# Patient Record
Sex: MALE | Race: WHITE | URBAN - METROPOLITAN AREA
[De-identification: names, ages, dates, MRNs, and addresses within clinical notes are randomized per-mention and may not be internally consistent; named-entity substitution may affect disease eponyms.]

---

## 2018-08-03 ENCOUNTER — OFFICE VISIT (OUTPATIENT)
Dept: INTERNAL MEDICINE | Facility: CLINIC | Age: 53
End: 2018-08-03
Payer: COMMERCIAL

## 2018-08-03 VITALS
TEMPERATURE: 98.1 F | OXYGEN SATURATION: 93 % | SYSTOLIC BLOOD PRESSURE: 128 MMHG | DIASTOLIC BLOOD PRESSURE: 90 MMHG | BODY MASS INDEX: 32.64 KG/M2 | HEART RATE: 76 BPM | HEIGHT: 69 IN | WEIGHT: 220.4 LBS | RESPIRATION RATE: 14 BRPM

## 2018-08-03 DIAGNOSIS — Z11.4 SCREENING FOR HIV (HUMAN IMMUNODEFICIENCY VIRUS): ICD-10-CM

## 2018-08-03 DIAGNOSIS — R03.0 ELEVATED BLOOD PRESSURE READING WITHOUT DIAGNOSIS OF HYPERTENSION: ICD-10-CM

## 2018-08-03 DIAGNOSIS — Z13.1 SCREENING FOR DIABETES MELLITUS: ICD-10-CM

## 2018-08-03 DIAGNOSIS — Z13.220 SCREENING CHOLESTEROL LEVEL: ICD-10-CM

## 2018-08-03 DIAGNOSIS — Z00.00 ENCOUNTER FOR ROUTINE ADULT HEALTH EXAMINATION WITHOUT ABNORMAL FINDINGS: Primary | ICD-10-CM

## 2018-08-03 LAB
CHOLEST SERPL-MCNC: 181 MG/DL
GLUCOSE SERPL-MCNC: 103 MG/DL (ref 70–99)
HDLC SERPL-MCNC: 41 MG/DL
LDLC SERPL CALC-MCNC: 100 MG/DL
NONHDLC SERPL-MCNC: 140 MG/DL
TRIGL SERPL-MCNC: 199 MG/DL

## 2018-08-03 PROCEDURE — 36415 COLL VENOUS BLD VENIPUNCTURE: CPT | Performed by: PHYSICIAN ASSISTANT

## 2018-08-03 PROCEDURE — 87389 HIV-1 AG W/HIV-1&-2 AB AG IA: CPT | Performed by: PHYSICIAN ASSISTANT

## 2018-08-03 PROCEDURE — 99386 PREV VISIT NEW AGE 40-64: CPT | Performed by: PHYSICIAN ASSISTANT

## 2018-08-03 PROCEDURE — 82947 ASSAY GLUCOSE BLOOD QUANT: CPT | Performed by: PHYSICIAN ASSISTANT

## 2018-08-03 PROCEDURE — 80061 LIPID PANEL: CPT | Performed by: PHYSICIAN ASSISTANT

## 2018-08-03 NOTE — PATIENT INSTRUCTIONS
Preventive Health Recommendations  Male Ages 50 - 64    Yearly exam:             See your health care provider every year in order to  o   Review health changes.   o   Discuss preventive care.    o   Review your medicines if your doctor has prescribed any.     Have a cholesterol test every 5 years, or more frequently if you are at risk for high cholesterol/heart disease.     Have a diabetes test (fasting glucose) every three years. If you are at risk for diabetes, you should have this test more often.     Have a colonoscopy at age 50, or have a yearly FIT test (stool test). These exams will check for colon cancer.      Talk with your health care provider about whether or not a prostate cancer screening test (PSA) is right for you.    You should be tested each year for STDs (sexually transmitted diseases), if you re at risk.     Shots: Get a flu shot each year. Get a tetanus shot every 10 years.     Nutrition:    Eat at least 5 servings of fruits and vegetables daily.     Eat whole-grain bread, whole-wheat pasta and brown rice instead of white grains and rice.     Get adequate Calcium and Vitamin D.     Lifestyle    Exercise for at least 150 minutes a week (30 minutes a day, 5 days a week). This will help you control your weight and prevent disease.     Limit alcohol to one drink per day.     No smoking.     Wear sunscreen to prevent skin cancer.     See your dentist every six months for an exam and cleaning.     See your eye doctor every 1 to 2 years.  Blood pressure lifestyle modifications:   1. Weight loss:    -calorie tracker- try an alton or web site calorie tracker for a few days to track your diet and make note of improvements that will aid in weight loss    2. Heart-Healthy diet   There is no single diet that is right for everyone. However, a healthy diet can include:  ?Lots of fruits, vegetables, and whole grains  - at least 5 servings of fruits and vegetables daily   - whole grains: try to make at least  "half of the grains in your diet whole grains       *examples: oatmeal, barley, brown rice, Millet, popcorn and whole wheat bread,        pasta and crackers   ?Some beans, peas, lentils, chickpeas, and similar foods  ?Some nuts, such as walnuts, almonds, and peanuts  ?Fat-free or low-fat milk and milk products  ?Some fish  To have a healthy diet, it's also important to limit or avoid sugar, sweets, meats, and refined grains. (Refined grains are found in white bread, white rice, most forms of pasta, and most packaged \"snack\" foods.)  3. Sodium reduction   Reduce salt -- Many people think that eating a low-sodium diet means avoiding the salt shaker and not adding salt when cooking. The truth is, not adding salt at the table or when you cook will only help a little. Almost all of the sodium you eat is already in the food you buy at the grocery store or at restaurants.  The most important thing you can do to cut down on sodium is to eat less processed food. That means that you should avoid most foods that are sold in cans, boxes, jars, and bags. You should also eat in restaurants less often.  To reduce the amount of sodium you get, buy fresh or fresh-frozen fruits, vegetables, and meats. (Fresh-frozen foods have had nothing added to them before freezing.) Then you can make meals at home, from scratch, with these ingredients.  As with the other changes, don't try to cut out salt all at once. Instead, choose 1 or 2 foods that have a lot of sodium and try to replace them with low-sodium choices. When you get used to those low-sodium options, find another food or 2 to change. Then keep going, until all the foods you eat are sodium-free or low in sodium.    4. Physical activity- 40 minutes most days of the week   Become more active -- If you want to be more active, you don't have to go to the gym or get all sweaty. It is possible to increase your activity level while doing everyday things you enjoy. Walking, gardening, and " "dancing are just a few of the things that you might try. As with all the other changes, the key is not to do too much too fast. If you don't do any activity now, start by walking for just a few minutes every other day. Do that for a few weeks. If you stick with it, try doing it for longer. But if you find that you don't like walking, try a different activity. Try to find a form of physical activity that you enjoy to do!    5. Limit alcohol intake to two drinks per day    Woman, do not have more than 1 \"standard drink\" of alcohol a day. If you are a man, do not have more than 2. A \"standard drink\" is:  ?A can or bottle that has 12 ounces of beer  ?A glass that has 5 ounces of wine  ?A shot that has 1.5 ounces of whiskey  "

## 2018-08-03 NOTE — PROGRESS NOTES
SUBJECTIVE:   CC: Baldo Treviño is an 52 year old male who presents for preventative health visit.     Physical   Annual:     Getting at least 3 servings of Calcium per day:  NO    Bi-annual eye exam:  Yes    Dental care twice a year:  Yes    Sleep apnea or symptoms of sleep apnea:  Excessive snoring    Diet:  Regular (no restrictions)    Frequency of exercise:  2-3 days/week    Duration of exercise:  15-30 minutes    Taking medications regularly:  Yes    Medication side effects:  None    Additional concerns today:  No    Patient is fasting. He has an insurance form through his wife's work that he needs completed with lab measurements.     Pt notes his mother  from colon cancer. Pt has been getting a colonoscopy every 5 years since age 36. He just had his last at age 51.     Today's PHQ-2 Score:   PHQ-2 (  [employer]) 8/3/2018   Q1: Little interest or pleasure in doing things 0   Q2: Feeling down, depressed or hopeless 0   PHQ-2 Score 0   Q1: Little interest or pleasure in doing things Not at all   Q2: Feeling down, depressed or hopeless Not at all   PHQ-2 Score 0       Abuse: Current or Past(Physical, Sexual or Emotional)- No  Do you feel safe in your environment - Yes    Social History   Substance Use Topics     Smoking status: Never Smoker     Smokeless tobacco: Never Used     Alcohol use No     Alcohol Use 8/3/2018   If you drink alcohol do you typically have greater than 3 drinks per day OR greater than 7 drinks per week? No       Last PSA: No results found for: PSA    Reviewed orders with patient. Reviewed health maintenance and updated orders accordingly - Yes    Reviewed and updated as needed this visit by clinical staff  Tobacco  Allergies  Meds  Problems  Med Hx  Surg Hx  Fam Hx  Soc Hx          Reviewed and updated as needed this visit by Provider  Tobacco  Meds  Problems  Fam Hx  Soc Hx         Review of Systems  CONSTITUTIONAL: NEGATIVE for fever, chills, change in  "weight  INTEGUMENTARY/SKIN: NEGATIVE for worrisome rashes, moles or lesions  EYES: NEGATIVE for vision changes or irritation  ENT: NEGATIVE for ear, mouth and throat problems  RESP: NEGATIVE for significant cough or SOB  CV: NEGATIVE for chest pain, palpitations or peripheral edema  GI: NEGATIVE for nausea, abdominal pain, heartburn, or change in bowel habits   male: negative for dysuria, hematuria, decreased urinary stream, erectile dysfunction, urethral discharge  MUSCULOSKELETAL: NEGATIVE for significant arthralgias or myalgia  NEURO: NEGATIVE for weakness, dizziness or paresthesias  PSYCHIATRIC: NEGATIVE for changes in mood or affect    OBJECTIVE:   BP (!) 128/94 (BP Location: Right arm, Patient Position: Chair, Cuff Size: Adult Large)  Pulse 76  Temp 98.1  F (36.7  C) (Oral)  Resp 14  Ht 5' 8.5\" (1.74 m)  Wt 220 lb 6.4 oz (100 kg)  SpO2 93%  BMI 33.02 kg/m2    Physical Exam  GENERAL: healthy, alert and no distress  EYES: Eyes grossly normal to inspection, PERRL and conjunctivae and sclerae normal  HENT: ear canals and TM's normal, nose and mouth without ulcers or lesions  NECK: no adenopathy, no asymmetry, masses, or scars and thyroid normal to palpation  RESP: lungs clear to auscultation - no rales, rhonchi or wheezes  CV: regular rate and rhythm, normal S1 S2, no S3 or S4, no murmur, click or rub, no peripheral edema and peripheral pulses strong  ABDOMEN: soft, nontender, no hepatosplenomegaly, no masses and bowel sounds normal  MS: no gross musculoskeletal defects noted, no edema  SKIN: no suspicious lesions or rashes  NEURO: Normal strength and tone, mentation intact and speech normal  PSYCH: mentation appears normal, affect normal/bright    ASSESSMENT/PLAN:     1. Encounter for routine adult health examination without abnormal findings  - normal exam without concerns   - work form partially completed, will finish and fax once labs are completed     2. Screening for HIV (human immunodeficiency " virus)  - HIV Screening    3. Screening cholesterol level  - Lipid panel reflex to direct LDL Fasting    4. Screening for diabetes mellitus  - GLUCOSE    5. Elevated blood pressure reading without diagnosis of hypertension  - reviewed lifestyle modifications   - recheck in 3-6 months     Pt agrees to above plan and all questions are answered.     India Arceo PA-C  [facility]

## 2018-08-03 NOTE — MR AVS SNAPSHOT
After Visit Summary   8/3/2018    Baldo Treviño    MRN: [id number]           Patient Information     Date Of Birth          1965        Visit Information        Provider Department      8/3/2018 8:00 AM India Arceo PA-C [facility]        Today's Diagnoses     Screening cholesterol level    -  1    Screening for HIV (human immunodeficiency virus)        Need for prophylactic vaccination with tetanus-diphtheria (TD)        Screening for diabetes mellitus          Care Instructions      Preventive Health Recommendations  Male Ages 50 - 64    Yearly exam:             See your health care provider every year in order to  o   Review health changes.   o   Discuss preventive care.    o   Review your medicines if your doctor has prescribed any.     Have a cholesterol test every 5 years, or more frequently if you are at risk for high cholesterol/heart disease.     Have a diabetes test (fasting glucose) every three years. If you are at risk for diabetes, you should have this test more often.     Have a colonoscopy at age 50, or have a yearly FIT test (stool test). These exams will check for colon cancer.      Talk with your health care provider about whether or not a prostate cancer screening test (PSA) is right for you.    You should be tested each year for STDs (sexually transmitted diseases), if you re at risk.     Shots: Get a flu shot each year. Get a tetanus shot every 10 years.     Nutrition:    Eat at least 5 servings of fruits and vegetables daily.     Eat whole-grain bread, whole-wheat pasta and brown rice instead of white grains and rice.     Get adequate Calcium and Vitamin D.     Lifestyle    Exercise for at least 150 minutes a week (30 minutes a day, 5 days a week). This will help you control your weight and prevent disease.     Limit alcohol to one drink per day.     No smoking.     Wear sunscreen to prevent skin cancer.     See your dentist every six months  "for an exam and cleaning.     See your eye doctor every 1 to 2 years.  Blood pressure lifestyle modifications:   1. Weight loss:    -calorie tracker- try an alton or web site calorie tracker for a few days to track your diet and make note of improvements that will aid in weight loss    2. Heart-Healthy diet   There is no single diet that is right for everyone. However, a healthy diet can include:  ?Lots of fruits, vegetables, and whole grains  - at least 5 servings of fruits and vegetables daily   - whole grains: try to make at least half of the grains in your diet whole grains       *examples: oatmeal, barley, brown rice, Millet, popcorn and whole wheat bread,        pasta and crackers   ?Some beans, peas, lentils, chickpeas, and similar foods  ?Some nuts, such as walnuts, almonds, and peanuts  ?Fat-free or low-fat milk and milk products  ?Some fish  To have a healthy diet, it's also important to limit or avoid sugar, sweets, meats, and refined grains. (Refined grains are found in white bread, white rice, most forms of pasta, and most packaged \"snack\" foods.)  3. Sodium reduction   Reduce salt -- Many people think that eating a low-sodium diet means avoiding the salt shaker and not adding salt when cooking. The truth is, not adding salt at the table or when you cook will only help a little. Almost all of the sodium you eat is already in the food you buy at the grocery store or at restaurants.  The most important thing you can do to cut down on sodium is to eat less processed food. That means that you should avoid most foods that are sold in cans, boxes, jars, and bags. You should also eat in restaurants less often.  To reduce the amount of sodium you get, buy fresh or fresh-frozen fruits, vegetables, and meats. (Fresh-frozen foods have had nothing added to them before freezing.) Then you can make meals at home, from scratch, with these ingredients.  As with the other changes, don't try to cut out salt all at once. " "Instead, choose 1 or 2 foods that have a lot of sodium and try to replace them with low-sodium choices. When you get used to those low-sodium options, find another food or 2 to change. Then keep going, until all the foods you eat are sodium-free or low in sodium.    4. Physical activity- 40 minutes most days of the week   Become more active -- If you want to be more active, you don't have to go to the gym or get all sweaty. It is possible to increase your activity level while doing everyday things you enjoy. Walking, gardening, and dancing are just a few of the things that you might try. As with all the other changes, the key is not to do too much too fast. If you don't do any activity now, start by walking for just a few minutes every other day. Do that for a few weeks. If you stick with it, try doing it for longer. But if you find that you don't like walking, try a different activity. Try to find a form of physical activity that you enjoy to do!    5. Limit alcohol intake to two drinks per day    Woman, do not have more than 1 \"standard drink\" of alcohol a day. If you are a man, do not have more than 2. A \"standard drink\" is:  ?A can or bottle that has 12 ounces of beer  ?A glass that has 5 ounces of wine  ?A shot that has 1.5 ounces of whiskey          Follow-ups after your visit        Who to contact     If you have questions or need follow up information about today's clinic visit or your schedule please contact [facility] directly at [phone].  Normal or non-critical lab and imaging results will be communicated to you by MyChart, letter or phone within 4 business days after the clinic has received the results. If you do not hear from us within 7 days, please contact the clinic through [employer] or phone. If you have a critical or abnormal lab result, we will notify you by phone as soon as possible.  Submit refill requests through [employer] or call your pharmacy and they will forward " "the refill request to us. Please allow 3 business days for your refill to be completed.          Additional Information About Your Visit        Team Aparthart Information     [employer] gives you secure access to your electronic health record. If you see a primary care provider, you can also send messages to your care team and make appointments. If you have questions, please call your primary care clinic.  If you do not have a primary care provider, please call [phone] and they will assist you.        Care EveryWhere ID     This is your Care EveryWhere ID. This could be used by other organizations to access your [facility] medical records  [id number]        Your Vitals Were     Pulse Temperature Respirations Height Pulse Oximetry BMI (Body Mass Index)    76 98.1  F (36.7  C) (Oral) 14 5' 8.5\" (1.74 m) 93% 33.02 kg/m2       Blood Pressure from Last 3 Encounters:   08/03/18 128/90    Weight from Last 3 Encounters:   08/03/18 220 lb 6.4 oz (100 kg)              We Performed the Following     GLUCOSE     HIV Screening     Lipid panel reflex to direct LDL Fasting        Primary Care Provider Fax #    Physician No Ref-Primary [phone]       No address on file        Equal Access to Services     [facility] : Hadii chandler renteria hadasho Soloriali, waaxda luqadaha, qaybta kaalmada adekrystenyada, ghislaine galo . So [facility] [phone].    ATENCIÓN: Si habla español, tiene a kemp disposición servicios gratuitos de asistencia lingüística. Wilfridoame al [phone].    We comply with applicable federal civil rights laws and [place] laws. We do not discriminate on the basis of race, color, national origin, age, disability, sex, sexual orientation, or gender identity.            Thank you!     Thank you for choosing [facility]  for your care. Our goal is always to provide you with excellent care. Hearing back from our patients is one way we can continue to improve our services. Please take a few " minutes to complete the written survey that you may receive in the mail after your visit with us. Thank you!             Your Updated Medication List - Protect others around you: Learn how to safely use, store and throw away your medicines at www.disposemymeds.org.          This list is accurate as of 8/3/18  8:42 AM.  Always use your most recent med list.                   Brand Name Dispense Instructions for use Diagnosis    MULTI VITAMIN DAILY PO

## 2018-08-06 LAB — HIV 1+2 AB+HIV1 P24 AG SERPL QL IA: NONREACTIVE

## 2018-12-20 ENCOUNTER — OFFICE VISIT (OUTPATIENT)
Dept: INTERNAL MEDICINE | Facility: CLINIC | Age: 53
End: 2018-12-20
Payer: COMMERCIAL

## 2018-12-20 VITALS
BODY MASS INDEX: 34.13 KG/M2 | OXYGEN SATURATION: 96 % | HEART RATE: 85 BPM | WEIGHT: 227.8 LBS | SYSTOLIC BLOOD PRESSURE: 120 MMHG | TEMPERATURE: 98.9 F | DIASTOLIC BLOOD PRESSURE: 86 MMHG

## 2018-12-20 DIAGNOSIS — Z23 NEED FOR VACCINATION: Primary | ICD-10-CM

## 2018-12-20 DIAGNOSIS — Z23 NEED FOR PROPHYLACTIC VACCINATION AND INOCULATION AGAINST INFLUENZA: ICD-10-CM

## 2018-12-20 DIAGNOSIS — R06.83 SNORING: ICD-10-CM

## 2018-12-20 PROCEDURE — 90682 RIV4 VACC RECOMBINANT DNA IM: CPT | Performed by: PHYSICIAN ASSISTANT

## 2018-12-20 PROCEDURE — 99212 OFFICE O/P EST SF 10 MIN: CPT | Mod: 25 | Performed by: PHYSICIAN ASSISTANT

## 2018-12-20 PROCEDURE — 90471 IMMUNIZATION ADMIN: CPT | Performed by: PHYSICIAN ASSISTANT

## 2018-12-20 PROCEDURE — 90472 IMMUNIZATION ADMIN EACH ADD: CPT | Performed by: PHYSICIAN ASSISTANT

## 2018-12-20 PROCEDURE — 90715 TDAP VACCINE 7 YRS/> IM: CPT | Performed by: PHYSICIAN ASSISTANT

## 2018-12-20 NOTE — PROGRESS NOTES
SUBJECTIVE:   Baldo Treviño is a 53 year old male who presents to clinic today for the following health issues:      Audie has concerns with sleep apnea and wants flu shot. Pt notes a couple years ago he had a colonoscopy and the nurses had concerns that patient had sleep apnea. Pt notes his wife notes that he has loud snoring and stops breathing. Pt notes feeling fatigued and sleepiness. Pt's brother has ZAHRA.     Problem list and histories reviewed & adjusted, as indicated.  Additional history: as documented    Reviewed and updated as needed this visit by clinical staff  Tobacco  Allergies  Meds  Problems       Reviewed and updated as needed this visit by Provider  Meds  Problems         OBJECTIVE:     /86   Pulse 85   Temp 98.9  F (37.2  C) (Oral)   Wt 103.3 kg (227 lb 12.8 oz)   SpO2 96%   BMI 34.13 kg/m    Body mass index is 34.13 kg/m .  GENERAL: healthy, alert and no distress    Diagnostic Test Results:  none     ASSESSMENT/PLAN:       1. Need for vaccination  - TDAP VACCINE (ADACEL) [21801.002]  - 1st  Administration  [82413]    2. Need for prophylactic vaccination and inoculation against influenza  - Vaccine Administration, Each Additional [21603]  - FLU VACCINE, (RIV4) RECOMBINANT HA  , IM (FluBlok, egg free) [93069]- >18 YRS ([facility] recommended  50-64 YRS)    3. Snoring  - SLEEP EVALUATION & MANAGEMENT REFERRAL - ADULT -[facility] [phone]  (Age 18 and up); Future      India Rosa PA-C  [facility]      Injectable Influenza Immunization Documentation    1.  Is the person to be vaccinated sick today?   No    2. Does the person to be vaccinated have an allergy to a component   of the vaccine?   No  Egg Allergy Algorithm Link    3. Has the person to be vaccinated ever had a serious reaction   to influenza vaccine in the past?   No    4. Has the person to be vaccinated ever had Guillain-Barré syndrome?   No    Form completed by  Eliz Mistry

## 2018-12-28 ENCOUNTER — OFFICE VISIT (OUTPATIENT)
Dept: SLEEP MEDICINE | Facility: CLINIC | Age: 53
End: 2018-12-28
Attending: PHYSICIAN ASSISTANT
Payer: COMMERCIAL

## 2018-12-28 VITALS
BODY MASS INDEX: 34.71 KG/M2 | SYSTOLIC BLOOD PRESSURE: 119 MMHG | RESPIRATION RATE: 16 BRPM | TEMPERATURE: 98.1 F | DIASTOLIC BLOOD PRESSURE: 85 MMHG | WEIGHT: 229 LBS | HEART RATE: 92 BPM | HEIGHT: 68 IN

## 2018-12-28 DIAGNOSIS — G47.33 OSA (OBSTRUCTIVE SLEEP APNEA): ICD-10-CM

## 2018-12-28 DIAGNOSIS — E66.811 OBESITY (BMI 30.0-34.9): ICD-10-CM

## 2018-12-28 PROCEDURE — 99243 OFF/OP CNSLTJ NEW/EST LOW 30: CPT | Performed by: INTERNAL MEDICINE

## 2018-12-28 ASSESSMENT — MIFFLIN-ST. JEOR: SCORE: 1863.24

## 2018-12-28 NOTE — PATIENT INSTRUCTIONS
Your BMI is Body mass index is 34.82 kg/m .  Weight management is a personal decision.  If you are interested in exploring weight loss strategies, the following discussion covers the approaches that may be successful. Body mass index (BMI) is one way to tell whether you are at a healthy weight, overweight, or obese. It measures your weight in relation to your height.  A BMI of 18.5 to 24.9 is in the healthy range. A person with a BMI of 25 to 29.9 is considered overweight, and someone with a BMI of 30 or greater is considered obese. More than two-thirds of American adults are considered overweight or obese.  Being overweight or obese increases the risk for further weight gain. Excess weight may lead to heart disease and diabetes.  Creating and following plans for healthy eating and physical activity may help you improve your health.  Weight control is part of healthy lifestyle and includes exercise, emotional health, and healthy eating habits. Careful eating habits lifelong are the mainstay of weight control. Though there are significant health benefits from weight loss, long-term weight loss with diet alone may be very difficult to achieve- studies show long-term success with dietary management in less than 10% of people. Attaining a healthy weight may be especially difficult to achieve in those with severe obesity. In some cases, medications, devices and surgical management might be considered.  What can you do?  If you are overweight or obese and are interested in methods for weight loss, you should discuss this with your provider.     Consider reducing daily calorie intake by 500 calories.     Keep a food journal.     Avoiding skipping meals, consider cutting portions instead.    Diet combined with exercise helps maintain muscle while optimizing fat loss. Strength training is particularly important for building and maintaining muscle mass. Exercise helps reduce stress, increase energy, and improves fitness.  Increasing exercise without diet control, however, may not burn enough calories to loose weight.       Start walking three days a week 10-20 minutes at a time    Work towards walking thirty minutes five days a week     Eventually, increase the speed of your walking for 1-2 minutes at time    In addition, we recommend that you review healthy lifestyles and methods for weight loss available through the National Institutes of Health patient information sites:  http://win.niddk.nih.gov/publications/index.htm    And look into health and wellness programs that may be available through your health insurance provider, employer, local community center, or [employer]oretha club.    Weight management plan: Patient was referred to their PCP to discuss a diet and exercise plan.

## 2018-12-28 NOTE — PROGRESS NOTES
Sleep Consultation:    Date on this visit: 12/28/2018    Baldo Treviño is sent by India Rosa for a sleep consultation regarding snoring and possible sleep apnea.    Primary Physician: No Ref-Primary, Physician      He has a history of obesity.  Just moved to the [facility] from [facility].  Family is still in [place].       Schedule - Moved up to take over .  Currently, typically in the office 8 - 5:30 and usually getting home around 6 - 6:30 PM.   Wakes around 7:30 AM and out of bed 15 minutes later.  Into bed around 11 PM.  On phone for 30 minutes and then asleep anywhere from 5 minutes to 60 minutes.    Sleep Disordered Breathing - Loud snoring, witnessed apneas, and snort arousals.   Has nocturia 2 - 4 times per night.  Has regular nocturnal GERD.   Upon waking feels sluggish.  He denies morning headaches.  Does get morning dry mouth.  Does get morning sinus congestion.     Patient was counseled on the importance of driving while alert, to pull over if drowsy, or nap before getting into the vehicle if sleepy.    Movement/Behaviors - No somniloquy.  No somnambulism (until age 26).  No sleep related eating.  No dream enactment behavior.   He reports bruxism. No splint or mouthguard.   Patient denies typical restless legs syndrome symptoms.    Alcohol use - None  Caffeine intake - 1 cups/day of coffee, 2 sodas/day. Last caffeine intake is usually dinnertime.  Tobacco exposure - None  Illicit substances - None    Lives with wife and 18-year-old daughter.  Son is living in [place] as a teacher.  Has 1 pet, dog (living with wife right now).     Does have family history of snoring in brothers, and Obstructive Sleep Apnea on CPAP in one brother.    Allergies:    No Known Allergies    Medications:    Current Outpatient Medications   Medication Sig Dispense Refill     Multiple Vitamin (MULTI VITAMIN DAILY PO)          Problem List:  There are no active problems to display for this  "patient.       Past Medical/Surgical History:  No past medical history on file.  Past Surgical History:   Procedure Laterality Date     APPENDECTOMY       EYE SURGERY Bilateral        Social History:  Social History     Socioeconomic History     Marital status:      Spouse name: Not on file     Number of children: Not on file     Years of education: Not on file     Highest education level: Not on file   Social Needs     Financial resource strain: Not on file     Food insecurity - worry: Not on file     Food insecurity - inability: Not on file     Transportation needs - medical: Not on file     Transportation needs - non-medical: Not on file   Occupational History     Not on file   Tobacco Use     Smoking status: Never Smoker     Smokeless tobacco: Never Used   Substance and Sexual Activity     Alcohol use: No     Drug use: No     Sexual activity: Yes     Partners: Female   Other Topics Concern     Parent/sibling w/ CABG, MI or angioplasty before 65F 55M? Not Asked   Social History Narrative     Not on file       Family History:  Family History   Problem Relation Age of Onset     Colon Cancer Mother        Review of Systems:  A complete review of systems reviewed by me is negative with the exeption of what has been mentioned in the history of present illness.    Physical Examination:  Vitals: /85   Pulse 92   Temp 98.1  F (36.7  C) (Oral)   Resp 16   Ht 1.727 m (5' 8\")   Wt 103.9 kg (229 lb)   BMI 34.82 kg/m    BMI= Body mass index is 34.82 kg/m .    Neck Cir (cm): 46 cm    Malcolm Total Score 12/28/2018   Total score - Malcolm 17     BRANDI Total Score: 17 (12/28/18 0931)    General appearance: No apparent distress, well groomed.    HEENT:   Head: Normocephalic, atraumatic.  Eyes: PERRL  Nose: Nares patent.  No exudate.  No septal deviation noted.  Mouth: Teeth: Normal   Tongue: Normal  Oropharynx:  Mallampati Classification: I    Tonsils: Grade 0    Uvula: Normal    Neck: Supple without bruit.  "    Neck Cir (cm): 46 cm (12/28/2018  9:33 AM)    Musculoskeletal: No edema noted.  No clubbing or cyanosis.  Skin: Warm, dry, intact.  Neurologic: Alert and oriented to person, place and time   Gait is normal.  Psychiatric: Affect pleasant.  Mood good.    Impression/Plan:  1. ZAHRA (obstructive sleep apnea)  I have a high suspicion for ZAHRA based on presence of snoring, snort arousals, witnessed apneas, enlarged neck girth >= 43 cm for male, and obesity with excessive daytime sleepiness. We discussed pathophysiology of obstructive sleep apnea, testing with overnight polysomnography, and treatment modalities (CPAP only discussed at this visit). We discussed consequences of untreated ZAHRA. Patient is interested in treatment and willing to undergo overnight sleep testing.    Home sleep testing is appropriate for this patient  Study has been ordered today.    - SLEEP EVALUATION & MANAGEMENT REFERRAL - [facility] [phone]  (Age 18 and up)  - HST-Home Sleep Apnea Test; Future    2. Obesity (BMI 30.0-34.9)  We discussed the link between obesity, sleep apnea, and health outcomes.  Patient was encouraged to decrease caloric intake and increase activity levels to try to move towards a normal weight.  He was encouraged to discuss further strategies with his primary care provider.      Literature provided regarding sleep apnea.      He will follow up with me in approximately two weeks after his sleep study has been competed to review the results and discuss plan of care.       Sleep testing reviewed.  Obstructive sleep apnea reviewed.  Complications of untreated sleep apnea were reviewed.    Fito Maravilla MD, Sleep Physician  Dec 28, 2018     CC: India Rosa

## 2018-12-28 NOTE — NURSING NOTE
"Chief Complaint   Patient presents with     Sleep Problem     I wake up multiple times at night, sleep not restful, loud snoring        Initial /85   Pulse 92   Temp 98.1  F (36.7  C) (Oral)   Resp 16   Ht 1.727 m (5' 8\")   Wt 103.9 kg (229 lb)   BMI 34.82 kg/m   Estimated body mass index is 34.82 kg/m  as calculated from the following:    Height as of this encounter: 1.727 m (5' 8\").    Weight as of this encounter: 103.9 kg (229 lb).    Medication Reconciliation: complete     Neck circumference: 17.5  inches /46 centimeters.  ESS 17    Kathe Encinas MA    "

## 2019-03-05 ENCOUNTER — OFFICE VISIT (OUTPATIENT)
Dept: SLEEP MEDICINE | Facility: CLINIC | Age: 54
End: 2019-03-05
Payer: COMMERCIAL

## 2019-03-05 DIAGNOSIS — G47.33 OSA (OBSTRUCTIVE SLEEP APNEA): ICD-10-CM

## 2019-03-06 ENCOUNTER — DOCUMENTATION ONLY (OUTPATIENT)
Dept: SLEEP MEDICINE | Facility: CLINIC | Age: 54
End: 2019-03-06
Payer: COMMERCIAL

## 2019-03-06 ENCOUNTER — TELEPHONE (OUTPATIENT)
Dept: SLEEP MEDICINE | Facility: CLINIC | Age: 54
End: 2019-03-06

## 2019-03-06 NOTE — NURSING NOTE
HST failed. Patient notified and will repeat test. Charges have been removed.    Inna [place]  Sleep Clinic - Specialist

## 2019-03-06 NOTE — TELEPHONE ENCOUNTER
Left a voice mail for patient to call and REDO his HST. He had no air flow on his canula we think his canula was kinked.    Radha Rodriguez

## 2019-03-06 NOTE — PROGRESS NOTES
HST failed. Patient notified and will repeat test. Charges have been removed.    Inna [place]  Sleep Clinic - Specialist

## 2019-03-12 ENCOUNTER — OFFICE VISIT (OUTPATIENT)
Dept: SLEEP MEDICINE | Facility: CLINIC | Age: 54
End: 2019-03-12
Payer: COMMERCIAL

## 2019-03-12 DIAGNOSIS — G47.33 OSA (OBSTRUCTIVE SLEEP APNEA): Primary | ICD-10-CM

## 2019-03-12 PROCEDURE — G0399 HOME SLEEP TEST/TYPE 3 PORTA: HCPCS | Performed by: INTERNAL MEDICINE

## 2019-03-13 ENCOUNTER — DOCUMENTATION ONLY (OUTPATIENT)
Dept: SLEEP MEDICINE | Facility: CLINIC | Age: 54
End: 2019-03-13
Payer: COMMERCIAL

## 2019-03-13 ENCOUNTER — OFFICE VISIT (OUTPATIENT)
Dept: SLEEP MEDICINE | Facility: CLINIC | Age: 54
End: 2019-03-13
Payer: COMMERCIAL

## 2019-03-13 VITALS
DIASTOLIC BLOOD PRESSURE: 82 MMHG | HEART RATE: 96 BPM | HEIGHT: 68 IN | SYSTOLIC BLOOD PRESSURE: 124 MMHG | WEIGHT: 229 LBS | BODY MASS INDEX: 34.71 KG/M2 | OXYGEN SATURATION: 100 % | RESPIRATION RATE: 16 BRPM

## 2019-03-13 DIAGNOSIS — G47.33 OSA (OBSTRUCTIVE SLEEP APNEA): ICD-10-CM

## 2019-03-13 PROCEDURE — 99213 OFFICE O/P EST LOW 20 MIN: CPT | Performed by: PHYSICIAN ASSISTANT

## 2019-03-13 ASSESSMENT — MIFFLIN-ST. JEOR: SCORE: 1863.24

## 2019-03-13 NOTE — PROCEDURES
"HOME SLEEP STUDY INTERPRETATION    Patient: Baldo Treviño  MRN: [id number]  YOB: 1965  Study Date: 3/12/2019  Referring Provider: No Ref-Primary, Physician;   Ordering Provider: Bennett Goltz, PA     Indications for Home Study: Baldo Treviño is a 53 year old male with a history of obesity who presents with symptoms suggestive of obstructive sleep apnea.    Estimated body mass index is 34.82 kg/m  as calculated from the following:    Height as of 18: 1.727 m (5' 8\").    Weight as of 18: 103.9 kg (229 lb).  Total score - [place]: 17 (2018  9:31 AM)  STOP-BAN/8    Data: A full night home sleep study was performed recording the standard physiologic parameters including body position, movement, sound, nasal pressure, thermal oral airflow, chest and abdominal movements with respiratory inductance plethysmography, and oxygen saturation by pulse oximetry. Pulse rate was estimated by oximetry recording. This study was considered adequate based on > 4 hours of quality oximetry and respiratory recording. As specified by the AASM Manual for the Scoring of Sleep and Associated events, version 2.3, Rule VIII.D 1B, 4% oxygen desaturation scoring for hypopneas is used as a standard of care on all home sleep apnea testing.    Analysis Time:  416 minutes    Respiration:   Sleep Associated Hypoxemia: sustained hypoxemia was present. Baseline oxygen saturation was 91%.  Time with saturation less than or equal to 88% was 101.4 minutes. The lowest oxygen saturation was 72%.   Snoring: Snoring was present.  Respiratory events: The home study revealed a presence of 267 obstructive apneas and 50 mixed and 29 central apneas. There were 93 hypopneas resulting in a combined apnea/hypopnea index [AHI] of 63 events per hour.  AHI was 77.4 per hour supine, - per hour prone, 61.4 per hour on left side, and 56.7 per hour on right side.   Pattern: Excluding events noted above, respiratory rate and pattern " was Normal.    Position: Percent of time spent: supine - 24.8%, prone - 0%, on left - 40.7%, on right - 33.8%.    Heart Rate: By pulse oximetry normal rate was noted.     Assessment:   Severe obstructive sleep apnea.  Sleep associated hypoxemia was present.    Recommendations:  Consider auto-CPAP at 5-15 cmH2O or polysomnography with full night PAP titration.  Suggest optimizing sleep hygiene and avoiding sleep deprivation.  Weight management.    Diagnosis Code(s): Obstructive Sleep Apnea G47.33, Hypoxemia G47.36    Osvaldo Carrizales MD, MD, March 13, 2019   Diplomate, American Board of Psychiatry and Neurology, Sleep Medicine

## 2019-03-13 NOTE — NURSING NOTE
"Chief Complaint   Patient presents with     Study Results     Follow up hst results       Initial /82   Pulse 96   Resp 16   Ht 1.727 m (5' 8\")   Wt 103.9 kg (229 lb)   SpO2 100%   BMI 34.82 kg/m   Estimated body mass index is 34.82 kg/m  as calculated from the following:    Height as of this encounter: 1.727 m (5' 8\").    Weight as of this encounter: 103.9 kg (229 lb).    Medication Reconciliation: complete    ESS 12    Kathe Encinas MA      "

## 2019-03-13 NOTE — PROGRESS NOTES
This HSAT was performed using a Noxturnal T3 device which recorded snore, sound, movement activity, body position, nasal pressure, oronasal thermal airflow, pulse, oximetry and both chest and abdominal respiratory effort. HSAT data was restricted to the time patient states they were in bed.     HSAT was scored using 1B 4% hypopnea rule.     HST AHI (Non-PAT): 63.3  Snoring was reported as loud.  Time with SpO2 below 89% was 101.4 minutes.   Overall signal quality was good     Pt will follow up with sleep provider to determine appropriate therapy.

## 2019-03-13 NOTE — PROGRESS NOTES
Sleep Study Follow-Up Visit:    Date on this visit: 3/13/2019    Baldo Treviño comes in today for follow-up of his sleep study done on 3/12/2019 at the [facility] for snoring and possible sleep apnea.    Analysis Time:  416 minutes     Respiration:   Sleep Associated Hypoxemia: sustained hypoxemia was present. Baseline oxygen saturation was 91%.  Time with saturation less than or equal to 88% was 101.4 minutes. The lowest oxygen saturation was 72%.   Snoring: Snoring was present.  Respiratory events: The home study revealed a presence of 267 obstructive apneas and 50 mixed and 29 central apneas. There were 93 hypopneas resulting in a combined apnea/hypopnea index [AHI] of 63 events per hour.  AHI was 77.4 per hour supine, - per hour prone, 61.4 per hour on left side, and 56.7 per hour on right side.   Pattern: Excluding events noted above, respiratory rate and pattern was Normal.     Position: Percent of time spent: supine - 24.8%, prone - 0%, on left - 40.7%, on right - 33.8%.     Heart Rate: By pulse oximetry normal rate was noted.     Past medical/surgical history, family history, social history, medications and allergies were reviewed.      Problem List:  Patient Active Problem List    Diagnosis Date Noted     ZAHRA (obstructive sleep apnea) 03/13/2019     Priority: Medium     Severe ZAHRA       Obesity (BMI 30.0-34.9) 12/28/2018     Priority: Medium        Impression/Plan:    (G47.33) ZAHRA (obstructive sleep apnea)  Comment: Severe ZAHRA, AHI 63/hr.  Plan: Comprehensive DME        I am prescribing auto CPAP 6-17 cm, heated humidifier and compliance meter. The patient was informed of the mask exchange policy and the compliance goals. They were also informed that they would be followed by the sleep therapy management team during the first month of CPAP use.       He will follow up with me in about 2 month(s).     Fifteen minutes spent with patient, all of which were spent face-to-face counseling,  consulting, coordinating plan of care.      Bennett Goltz, PA-C    CC:India Rosa PA-C

## 2019-03-13 NOTE — PATIENT INSTRUCTIONS
You will be provided with an auto-titrating CPAP with a pressure range of 6-17 cm with heated humidity to limit nasal congestion. Adjust the heat level on humidifier to find a setting that prevents dry nose but does not cause condensation in the hose or mask. Use distilled water in the humidifier.  The CPAP has a ramp function that starts the pressure lower than your prescribed pressure and gradually increases it over a number of minutes.  This may make it easier to fall asleep.    Try to use the CPAP every-night, all night (minimum of 4 hours). Many insurances require that we prove you are using the CPAP at least 4 hours on at least 70% of nights over a 30 day period. We have 90 days to meet those criteria.            Discussed weight management and the impact of weight gain on sleep apnea.  Let me know if you snore or feel the pressure is too high.    You can get new supplies (mask, hose and filter) for your CPAP every 3-6 months, covered by insurance. You do not need to get supplies that often, but they are available if you would like them.  You may exchange the mask once within the first month if you feel the initial mask does not fit well.  Contact your medical equipment provider for equipment issues.  Please let me know if you have any return of snoring, daytime sleepiness or poor sleep quality. We will want to make sure your CPAP is adequately treating your apnea.  There is a website called CPAP.com that has accessories that may make CPAP use easier. Please visit it at your convenience.  Our phone number is [phone].    Follow-up 2 month.  Bring your CPAP machine with you to the follow up appointment.    Your BMI is Body mass index is 34.82 kg/m .  Weight management is a personal decision.  If you are interested in exploring weight loss strategies, the following discussion covers the approaches that may be successful. Body mass index (BMI) is one way to tell whether you are at a healthy weight,  overweight, or obese. It measures your weight in relation to your height.  A BMI of 18.5 to 24.9 is in the healthy range. A person with a BMI of 25 to 29.9 is considered overweight, and someone with a BMI of 30 or greater is considered obese. More than two-thirds of American adults are considered overweight or obese.  Being overweight or obese increases the risk for further weight gain. Excess weight may lead to heart disease and diabetes.  Creating and following plans for healthy eating and physical activity may help you improve your health.  Weight control is part of healthy lifestyle and includes exercise, emotional health, and healthy eating habits. Careful eating habits lifelong are the mainstay of weight control. Though there are significant health benefits from weight loss, long-term weight loss with diet alone may be very difficult to achieve- studies show long-term success with dietary management in less than 10% of people. Attaining a healthy weight may be especially difficult to achieve in those with severe obesity. In some cases, medications, devices and surgical management might be considered.  What can you do?  If you are overweight or obese and are interested in methods for weight loss, you should discuss this with your provider.     Consider reducing daily calorie intake by 500 calories.     Keep a food journal.     Avoiding skipping meals, consider cutting portions instead.    Diet combined with exercise helps maintain muscle while optimizing fat loss. Strength training is particularly important for building and maintaining muscle mass. Exercise helps reduce stress, increase energy, and improves fitness. Increasing exercise without diet control, however, may not burn enough calories to loose weight.       Start walking three days a week 10-20 minutes at a time    Work towards walking thirty minutes five days a week     Eventually, increase the speed of your walking for 1-2 minutes at time    In  addition, we recommend that you review healthy lifestyles and methods for weight loss available through the National Institutes of Health patient information sites:  http://win.niddk.nih.gov/publications/index.htm    And look into health and wellness programs that may be available through your health insurance provider, employer, local community center, or doretha club.    Weight management plan: Patient was referred to their PCP to discuss a diet and exercise plan.

## 2019-03-20 ENCOUNTER — TELEPHONE (OUTPATIENT)
Dept: SLEEP MEDICINE | Facility: CLINIC | Age: 54
End: 2019-03-20

## 2019-04-01 ENCOUNTER — DOCUMENTATION ONLY (OUTPATIENT)
Dept: SLEEP MEDICINE | Facility: CLINIC | Age: 54
End: 2019-04-01

## 2019-04-01 NOTE — PROGRESS NOTES
Patient was offered choice of vendor and chose [facility].  Patient Baldo Treviño was set up at [place] on April 1, 2019. Patient received a [employer] DreamStation Auto. Pressures were set at 6-17 cm H2O.   Patient s ramp is 5 cm H2O for Auto and FLEX/EPR is A Flex.  Patient received a Resmed Mask name: AIRFIT P10  Pillow mask size Medium, heated tubing and heated humidifier.  Patient is enrolled in the STM Program and does not need to meet compliance. Patient has a follow up on 5/20/2019 with Bennett Goltz, PA.    Srinath Lassiter

## 2019-04-04 ENCOUNTER — DOCUMENTATION ONLY (OUTPATIENT)
Dept: SLEEP MEDICINE | Facility: CLINIC | Age: 54
End: 2019-04-04

## 2019-04-04 NOTE — PROGRESS NOTES
3 DAY STM VISIT    Diagnostic AHI:  63.3 HST    Patient contacted for 3 day STM visit.  Subjective measures:  Patient stated the first night he struggled breathing against the pressure. Patient stated the next two nights he slept a lot better. Patient asked about the sleep alton.      Device type: Auto-CPAP  PAP settings from order::  CPAP min 6 cm  H20       CPAP max 17 cm  H20  Mask type:    Nasal Mask     Device settings from machine      Min CPAP 6            Max CPAP 17      Assessment: Nightly usage over four hours.  Action plan: Pt to have f/u 14 day STM visit.  Patient has a follow up visit scheduled:   yes within 31-90 days of set up.

## 2019-04-16 ENCOUNTER — DOCUMENTATION ONLY (OUTPATIENT)
Dept: SLEEP MEDICINE | Facility: CLINIC | Age: 54
End: 2019-04-16
Payer: COMMERCIAL

## 2019-04-16 NOTE — PROGRESS NOTES
14 DAY STM VISIT    Diagnostic AHI:  63.3 HST      Message left for patient to return call.     Assessment: Pt meeting objective benchmarks.       Action plan: Pt to have 30 day STM visit.    Device type: Auto-CPAP    PAP settings: CPAP min 6 cm  H20       CPAP max 17 cm  H20       90th % pressure 12.5 cm  H20    Mask type:  Nasal Mask    Objective measures: 14 day rolling measures         Compliance  85 %     % of night spent in large leak  1 % last  upload      AHI 5.91   last  upload      Average number of minutes 362          Objective measure goal  Compliance   Goal >70%  Leak   Goal < 10%  AHI  Goal < 5  Usage  Goal >240

## 2019-05-02 ENCOUNTER — DOCUMENTATION ONLY (OUTPATIENT)
Dept: SLEEP MEDICINE | Facility: CLINIC | Age: 54
End: 2019-05-02

## 2019-05-02 DIAGNOSIS — G47.33 OSA (OBSTRUCTIVE SLEEP APNEA): ICD-10-CM

## 2019-05-02 NOTE — PROGRESS NOTES
30 DAY STM VISIT    Diagnostic AHI:  63.3 HST      Message left for patient to return call     Assessment: Pt not meeting objective benchmarks for AHI    Action plan: waiting for patient to return call.  and 2 week STM recheck appt scheduled  Patient hasscheduled a follow up visit with Bennett Goltz, PA on 5/20/19  Device type: Auto-CPAP  PAP settings: CPAP min 6 cm  H20     CPAP max 17 cm  H20     90th % [id number] cm  H20    Mask type:  Nasal Mask    Objective measures: 14 day rolling measures         Compliance  71 %     % of night spent in large leak  4 % last  upload      AHI 7.18   last  upload      Average number of minutes 317          Objective measure goal  Compliance   Goal >70%  Leak   Goal < 10%  AHI  Goal < 5  Usage  Goal >240

## 2019-05-16 ENCOUNTER — DOCUMENTATION ONLY (OUTPATIENT)
Dept: SLEEP MEDICINE | Facility: CLINIC | Age: 54
End: 2019-05-16

## 2019-05-16 DIAGNOSIS — G47.33 OSA (OBSTRUCTIVE SLEEP APNEA): ICD-10-CM

## 2019-05-16 NOTE — PROGRESS NOTES
[facility] Recheck Visit    Data only recheck     Assessment: Pt not meeting objective benchmarks. Pt met 30 day compliance    Action plan:  Patient has a follow up visit with Bennett Goltz, PA on 5/20/19  Device type: Auto-CPAP  PAP settings: CPAP min 6 cm  H20     CPAP max 17 cm  H20     90th % [id number] cm  H20    Objective measures: 14 day rolling measures         Compliance  57 %     % of night spent in large leak  3 % last  upload      AHI 3.65   last  upload      Average number of minutes 247    Diagnostic AHI:  63.3          Objective measure goal  Compliance   Goal >70%  Leak   Goal < 10%  AHI  Goal < 5  Usage  Goal >240

## 2019-06-10 ENCOUNTER — TELEPHONE (OUTPATIENT)
Dept: SLEEP MEDICINE | Facility: CLINIC | Age: 54
End: 2019-06-10

## 2019-06-10 NOTE — TELEPHONE ENCOUNTER
Left voice mail for patient to call back to reschedule his appointment on 07/05/19. Told patient that we have 07/11/19 and 07/12/19 on hold for patients that needed to be rescheduled from 07/05/19.    Radha Rodriguez

## 2019-08-28 ENCOUNTER — OFFICE VISIT (OUTPATIENT)
Dept: SLEEP MEDICINE | Facility: CLINIC | Age: 54
End: 2019-08-28
Payer: COMMERCIAL

## 2019-08-28 VITALS
SYSTOLIC BLOOD PRESSURE: 115 MMHG | BODY MASS INDEX: 32.74 KG/M2 | RESPIRATION RATE: 14 BRPM | HEART RATE: 69 BPM | DIASTOLIC BLOOD PRESSURE: 77 MMHG | WEIGHT: 216 LBS | OXYGEN SATURATION: 97 % | HEIGHT: 68 IN

## 2019-08-28 DIAGNOSIS — G47.33 OSA (OBSTRUCTIVE SLEEP APNEA): ICD-10-CM

## 2019-08-28 PROCEDURE — 99214 OFFICE O/P EST MOD 30 MIN: CPT | Performed by: PHYSICIAN ASSISTANT

## 2019-08-28 ASSESSMENT — MIFFLIN-ST. JEOR: SCORE: 1804.27

## 2019-08-28 NOTE — PROGRESS NOTES
"CPAP Follow-Up Visit:    Date on this visit: 8/28/2019    Baldo Treviño comes in today for follow-up of his CPAP use for severe ZAHRA. His medical history is significant for obesity.    HST on 3/12/19 showed an AHI of 63/hr, supine AHI 77.4/hr. O2 matias 72%, 101.4 minutes below 89%.  He is on auto CPAP 6-17 cm. He feels things are going pretty well. He had some trouble getting used to it. He does like the first big breath with it. He falls asleep right away. He tends to remove it in his sleep. He uses an AirFit p10 mask. He does feel better energy. He no longer naps on weekends. He does not snore with it. He denies dry nose or mouth. He has not needed water in the humidifier. He no longer wakes with heartburn. He also no longer gets a sore throat from snoring.  The compliance data shows that he has used the CPAP for 21/30 nights, 23.3% of nights for >4 hours.  The 90th% pressure is 10.7 cm.  The average time in large leak is 1 min.  The average nightly usage is 3:29.  The average AHI is 5.6/hr (1.5/hr are centrals). He has not used the CPAP on most weekends because he goes to [place] to sell his house. He does not bring the CPAP with him. Once he sells the house, his use on weekends will improve.    He has lost about 13 pounds since December. He has been using the \"Lose It\" alton to help track his calories.     Past medical/surgical history, family history, social history, medications and allergies were reviewed.      Problem List:  Patient Active Problem List    Diagnosis Date Noted     ZAHRA (obstructive sleep apnea) 03/13/2019     Priority: Medium     Severe ZAHRA       Obesity (BMI 30.0-34.9) 12/28/2018     Priority: Medium        Impression/Plan:    (G47.33) ZAHRA (obstructive sleep apnea)  Comment: He is benefiting greatly from CPAP but he removes the mask in his sleep. His usage is low, partially from that and partially because he goes to [place] on weekends and does not take it with him.  Plan: Comprehensive DME        " Continue auto CPAP 6-17 cm. He was advised to meet with [facility] to try other masks. We looked at a few options online. We reviewed compliance goals. We also talked about setting an alarm in the middle of the night to get the mask back on if he has removed it. If he starts waking with the mask still on, then he can set the alarm gradually later until he is waking up in the morning with it still on.      He will follow up with me in about 2 year(s), sooner if still removing the mask in his sleep.     Twenty-five minutes spent with patient, all of which were spent face-to-face counseling, consulting, coordinating plan of care.      Bennett Goltz, PA-C    CC: No ref. provider found

## 2019-08-28 NOTE — NURSING NOTE
"Chief Complaint   Patient presents with     CPAP Follow Up       Initial /77   Pulse 69   Resp 14   Ht 1.727 m (5' 8\")   Wt 98 kg (216 lb)   SpO2 97%   BMI 32.84 kg/m   Estimated body mass index is 32.84 kg/m  as calculated from the following:    Height as of this encounter: 1.727 m (5' 8\").    Weight as of this encounter: 98 kg (216 lb).    Medication Reconciliation: complete     ESS 6  Crystal Kelly MA      "

## 2019-08-28 NOTE — PATIENT INSTRUCTIONS
Your BMI is Body mass index is 32.84 kg/m .  Weight management is a personal decision.  If you are interested in exploring weight loss strategies, the following discussion covers the approaches that may be successful. Body mass index (BMI) is one way to tell whether you are at a healthy weight, overweight, or obese. It measures your weight in relation to your height.  A BMI of 18.5 to 24.9 is in the healthy range. A person with a BMI of 25 to 29.9 is considered overweight, and someone with a BMI of 30 or greater is considered obese. More than two-thirds of [place] adults are considered overweight or obese.  Being overweight or obese increases the risk for further weight gain. Excess weight may lead to heart disease and diabetes.  Creating and following plans for healthy eating and physical activity may help you improve your health.  Weight control is part of healthy lifestyle and includes exercise, emotional health, and healthy eating habits. Careful eating habits lifelong are the mainstay of weight control. Though there are significant health benefits from weight loss, long-term weight loss with diet alone may be very difficult to achieve- studies show long-term success with dietary management in less than 10% of people. Attaining a healthy weight may be especially difficult to achieve in those with severe obesity. In some cases, medications, devices and surgical management might be considered.  What can you do?  If you are overweight or obese and are interested in methods for weight loss, you should discuss this with your provider.     Consider reducing daily calorie intake by 500 calories.     Keep a food journal.     Avoiding skipping meals, consider cutting portions instead.    Diet combined with exercise helps maintain muscle while optimizing fat loss. Strength training is particularly important for building and maintaining muscle mass. Exercise helps reduce stress, increase energy, and improves fitness.  Increasing exercise without diet control, however, may not burn enough calories to loose weight.       Start walking three days a week 10-20 minutes at a time    Work towards walking thirty minutes five days a week     Eventually, increase the speed of your walking for 1-2 minutes at time    In addition, we recommend that you review healthy lifestyles and methods for weight loss available through the [employer] patient information sites:  http://win.niddk.nih.gov/publications/index.htm    And look into health and wellness programs that may be available through your health insurance provider, employer, local community center, or [employer].    Weight management plan: Patient was referred to their PCP to discuss a diet and exercise plan.

## 2019-10-01 ENCOUNTER — DOCUMENTATION ONLY (OUTPATIENT)
Dept: SLEEP MEDICINE | Facility: CLINIC | Age: 54
End: 2019-10-01

## 2019-10-01 DIAGNOSIS — G47.33 OSA (OBSTRUCTIVE SLEEP APNEA): ICD-10-CM

## 2019-10-01 NOTE — PROGRESS NOTES
6 Month [facility] visit    Diagnostic AHI:  63.3  PSG    Data only recheck     Assessment: Pt meeting objective benchmarks.     Action plan:  pt to follow up per provider request    Device type: Auto-CPAP  PAP settings: CPAP min 6 cm  H20     CPAP max 17 cm  H20     90th % pressure 10.4 cm  H20    Objective measures: 14 day rolling measures         Compliance  70 %     % of night spent in large leak  0 % last  upload      AHI 3.99   last  upload      Average number of minutes 313           Objective measure goal  Compliance   Goal >70%  Leak   Goal < 10%  AHI  Goal < 5  Usage  Goal >240

## 2020-03-11 ENCOUNTER — HEALTH MAINTENANCE LETTER (OUTPATIENT)
Age: 55
End: 2020-03-11

## 2021-01-03 ENCOUNTER — HEALTH MAINTENANCE LETTER (OUTPATIENT)
Age: 56
End: 2021-01-03

## 2021-04-25 ENCOUNTER — HEALTH MAINTENANCE LETTER (OUTPATIENT)
Age: 56
End: 2021-04-25

## 2021-07-13 ENCOUNTER — MYC MEDICAL ADVICE (OUTPATIENT)
Dept: SLEEP MEDICINE | Facility: CLINIC | Age: 56
End: 2021-07-13

## 2021-10-10 ENCOUNTER — HEALTH MAINTENANCE LETTER (OUTPATIENT)
Age: 56
End: 2021-10-10

## 2022-05-21 ENCOUNTER — HEALTH MAINTENANCE LETTER (OUTPATIENT)
Age: 57
End: 2022-05-21

## 2022-09-18 ENCOUNTER — HEALTH MAINTENANCE LETTER (OUTPATIENT)
Age: 57
End: 2022-09-18

## 2023-06-04 ENCOUNTER — HEALTH MAINTENANCE LETTER (OUTPATIENT)
Age: 58
End: 2023-06-04